# Patient Record
Sex: FEMALE | Race: WHITE | NOT HISPANIC OR LATINO | URBAN - METROPOLITAN AREA
[De-identification: names, ages, dates, MRNs, and addresses within clinical notes are randomized per-mention and may not be internally consistent; named-entity substitution may affect disease eponyms.]

---
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
## 2023-02-09 ENCOUNTER — OFFICE VISIT (OUTPATIENT)
Dept: ORTHOPEDIC SURGERY | Age: 18
End: 2023-02-09

## 2023-02-09 DIAGNOSIS — M76.52 PATELLAR TENDONITIS OF BOTH KNEES: ICD-10-CM

## 2023-02-09 DIAGNOSIS — M67.52 PLICA SYNDROME OF LEFT KNEE: Primary | ICD-10-CM

## 2023-02-09 DIAGNOSIS — M25.562 LEFT KNEE PAIN, UNSPECIFIED CHRONICITY: ICD-10-CM

## 2023-02-09 DIAGNOSIS — M76.51 PATELLAR TENDONITIS OF BOTH KNEES: ICD-10-CM

## 2023-02-09 NOTE — PROGRESS NOTES
Name: Garth Liu  YOB: 2005  Gender: female  MRN: [id number]      CC: Follow-up (Bilateral knees)       HPI: Garth Liu is a 19 y.o. female who returns for follow up on her bilateral knees. She was last evaluated by me in July and her patellar tendonitis had improved, but she had more left knee plica symptoms. She had an injection at that time and had good relief. She was evaluated by Dr. Brigette Whalen a few months ago and she altered her diet, had some blood work completed and was referred to GYN. Today she describes knee pain with not only sports but with getting up and down from a chair. She feels as though the plica symptoms have returned in the left knee. In the right knee she feels pain deep to the patellar tendon. Physical Examination:  General: no acute distress  Lungs: breathing easily  CV: regular rhythm by pulse  Left Knee: Tenderness palpation of the parapatellar region lateral superior patellar pouch. Some tenderness palpation of the inferior pole the patella in the midportion of patellar tendon pain with resisted knee extension. Negative meniscal provocative testing negative ligamentous testing. The right knee she has tenderness palpation over the patellar tendon pain with resisted knee extension        Assessment:     ICD-10-CM    1. Plica syndrome of left knee  M67.52 MRI KNEE LEFT WO CONTRAST     Amb External Referral To Physical Therapy      2. Patellar tendonitis of both knees  M76.51 Amb External Referral To Physical Therapy    M76.52       3. Left knee pain, unspecified chronicity  M25.562 MRI KNEE LEFT WO CONTRAST     Amb External Referral To Physical Therapy          Plan:   .  Recurrent symptoms. She did get good response to the intra-articular injection on the left which makes me think this likely is plica. However she does have continued recurrent patellar tendinitis symptoms. We are considering surgical intervention at this point as she is in the off season.   I think to guide that decision we need to repeat her MRI scan to see the involvement of the patellar tendon and discussed potential surgical options which may include Tenex versus open resection of the portion of the patellar tendon and a knee arthroscopy with plica resection. MRI was ordered today we also will restart her physical therapy which was ordered today and I will see her back after the MRI to review the results make a definitive treatment plan            Yovanny Mcmahon MD, [facility]

## 2023-02-23 ENCOUNTER — OFFICE VISIT (OUTPATIENT)
Dept: ORTHOPEDIC SURGERY | Age: 18
End: 2023-02-23

## 2023-02-23 DIAGNOSIS — M76.52 PATELLAR TENDINITIS, LEFT KNEE: Primary | ICD-10-CM

## 2023-02-23 DIAGNOSIS — M67.52 PLICA SYNDROME OF LEFT KNEE: ICD-10-CM

## 2023-02-23 NOTE — PROGRESS NOTES
Name: Alvin Leyva  YOB: 2005  Gender: female  MRN: [id number]      CC: MRI Left Knee follow up     HPI: Alvin Leyva is a 19 y.o. female who returns for follow up and MRI results on  left knee. She started physical therapy on Monday, 2/20/23. With a week. They think they found some mechanical issues with her foot and ankle mechanics which may help. She continues to have pain anteriorly in her knee as well as snapping on the left knee. Physical Examination:  General: no acute distress  Lungs: breathing easily  CV: regular rhythm by pulse  Left Knee:No effusion she has focal tenderness to palpation over the distal pole The patella and the proximal patellar tendon. Worse with resisted knee extension. MD she does have palpable plica and some catching and snapping when she goes from extension to flexion. Imaging:   I reviewed her repeat MRI scan which demonstrates progression of the proximal patellar tendinitis with some partial tearing of the deep articular fibers centrally. Significant tendinosis and central disease tendon. No significant edema of the distal pole the patella. All imaging interpreted by myself Yovanny Hammer MD independent of radiology review    Assessment:     ICD-10-CM    1. Patellar tendinitis, left knee  M76.52 Ambulatory referral to Orthopedic Surgery      2. Plica syndrome of left knee  M67.52 Ambulatory referral to Orthopedic Surgery           Plan: We reviewed the images together she had further progression of her proximal patellar tendinitis with partial tearing centrally compared to her previous MRI scan last year. She has been through extensive conservative management I do think she has 2 components of pain including plica syndrome as well as patellar tendinitis which is recalcitrant at this point.   We discussed continued conservative options versus surgical options I think is very reasonable to consider an open patellar tendon resection/repair as well as a knee arthroscopy with a plica resection. We discussed the postoperative recovery associated with this and hopefully a 4-month return to soccer. We will plan to schedule this after her spring break in about 5 weeks. She will continue physical therapy in the meantime. I will see her back for preoperative appointment with me for final discussion and reevaluation. Oswaldo Bains MD, [address] [place] [address] Sports Medicine

## 2023-02-28 DIAGNOSIS — M67.52 PLICA SYNDROME OF LEFT KNEE: Primary | ICD-10-CM

## 2023-02-28 DIAGNOSIS — M76.52 PATELLAR TENDINITIS, LEFT KNEE: ICD-10-CM

## 2023-02-28 DIAGNOSIS — M25.562 LEFT KNEE PAIN, UNSPECIFIED CHRONICITY: ICD-10-CM

## 2023-02-28 DIAGNOSIS — M25.561 RIGHT KNEE PAIN, UNSPECIFIED CHRONICITY: ICD-10-CM

## 2023-03-16 NOTE — PERIOP NOTE
Patient verified name and . Order for consent not found in EHR   Type 1b surgery, PAT phone assessment complete. Orders not received. Labs per surgeon: None  Labs per anesthesia protocol: None    Patient answered medical/surgical history questions at their best of ability. All prior to admission medications documented in EPIC. Patient instructed to take the following medications the day of surgery according to anesthesia guidelines with a small sip of water: None On the day before surgery please take Acetaminophen 1000mg in the morning and then again before bed. You may substitute for Tylenol 650 mg. Hold all vitamins 7 days prior to surgery and NSAIDS 5 days prior to surgery. Prescription meds to hold:None  Patient instructed on the following:    > Arrive at [facility], time of arrival to be called the day before by 1700  > NPO after midnight, unless otherwise indicated, including gum, mints, and ice chips  > Responsible adult must drive patient to the hospital, stay during surgery, and patient will need supervision 24 hours after anesthesia  > Use antibacterial soap in shower the night before surgery and on the morning of surgery  > All piercings must be removed prior to arrival.    > Leave all valuables (money and jewelry) at home but bring insurance card and ID on DOS.   > You may be required to pay a deductible or co-pay on the day of your procedure. You can pre-pay by calling [phone] if your surgery is at the [facility] or [phone] if your surgery is at the [facility] ED. > Do not wear make-up, nail polish, lotions, cologne, perfumes, powders, or oil on skin. Artificial nails are not permitted.

## 2023-03-16 NOTE — PERIOP NOTE
Deamadison King,      Thank you for completing your phone assessment with me today. Here are your requested surgery instructions. Please call #[phone] with any questions/concerns. [facility] [place], [address]/  Please arrive at [facility]; pre-op (#[phone]) will call you on the business day before your surgery with your arrival time. If you have any questions on the day of surgery, please call the pre-op dept. at the telephone number above. No food or drink after midnight which includes any gum, mints, candy, or ice chips. Please take these medications on the morning of surgery with a small sip of water: None. On the day before surgery take Acetaminophen 1000mg in the morning and at bedtime OR Acetaminophen 650mg in the morning, afternoon and bedtime. Please stop all vitamins/supplements 7 days prior to surgery and stop all NSAIDS (ibuprofen, naproxen, aleve, motrin, advil) 5 days before your surgery. A responsible adult must drive you to the hospital, remain in the building during surgery and you will need adult supervision for 24 hours after anesthesia. Please use an antibacterial soap (Dial, Safeguard, etc.) the night before surgery and on the morning of surgery. Do NOT wear deodorant, make-up, nail polish, lotions, cologne, perfumes, powders or oil on your skin. All piercings/metal/jewelry must be removed prior to arrival.  If you wear contacts then you will need to bring a case to store them in or wear your glasses. Please leave all your valuables at home but be sure to bring your insurance card and ID on the day of surgery for registration/identification. Our Guide to Surgery with additional information can be found:  [email or web address]     Per patient request, the above information was emailed to

## 2023-03-27 ENCOUNTER — OFFICE VISIT (OUTPATIENT)
Dept: ORTHOPEDIC SURGERY | Age: 18
End: 2023-03-27

## 2023-03-27 DIAGNOSIS — M67.52 PLICA SYNDROME OF LEFT KNEE: ICD-10-CM

## 2023-03-27 DIAGNOSIS — M76.52 PATELLAR TENDINITIS, LEFT KNEE: Primary | ICD-10-CM

## 2023-03-27 RX ORDER — OXYCODONE HYDROCHLORIDE 5 MG/1
5 TABLET ORAL EVERY 4 HOURS PRN
Qty: 20 TABLET | Refills: 0 | Status: SHIPPED | OUTPATIENT
Start: 2023-03-27 | End: 2023-04-03

## 2023-03-27 NOTE — PROGRESS NOTES
The brace was properly aligned medially and laterally along the length of the left Knee with the extension/flexion dials placed slightly above the patella (to insure that if brace slides down slightly the dials will still line up on either side of the patella/knee region). The brace is extended/shorten to the patient's leg length and to insure proper fit of the brace. The straps are tightened starting with the most distal strap and then strap proximal to the patella. The strap right below the patella is then secured and last is the strap highest on the quad. The straps are then trimmed for easier tightening of the brace for the patient. Patient read and signed documenting they understand and agree to [facility]'s current DME return policy.
Activity: Not on file   Stress: Not on file   Social Connections: Not on file   Intimate Partner Violence: Not on file   Housing Stability: Not on file            Physical Examination:  General: no acute distress  HEENT NCAT  Lungs: breathing easily  CV: regular rhythm by pulse  Abd: soft  Left Knee: Tenderness to palpation of the proximal patellar tendon. Pain with resisted knee extension. Assessment:     ICD-10-CM    1. Patellar tendinitis, left knee  M76.52 DME Authorization for POA - INT     oxyCODONE (ROXICODONE) 5 MG immediate release tablet      2. Plica syndrome of left knee  M67.52 DME Authorization for POA - INT     oxyCODONE (ROXICODONE) 5 MG immediate release tablet          Plan:   Recalcitrant patellar tendinitis as well as plica syndrome. We discussed that the surgical plan would be for left knee arthroscopy with likely plica resection and potentially arthroscopic debridement of the fat pad and patellar tendon. We discussed an open approach to the patellar tendinitis with excision and potential repair of a portion of the patellar tendon. We discussed the return to sport and likely a 3 to 6-month recovery. We discussed the pros and cons of the open approach versus Tenex. As well as the risk inherent to the surgery in general.  Including potential kneeling pain postoperative numbness stiffness, DVT/PE, anesthetic complications as well as risk of recurrent symptoms and potential kneeling pain. All of her questions have been answered she elects to proceed as planned. Jovita Meneses MD, [facility] and Sports Medicine

## 2023-03-27 NOTE — H&P (VIEW-ONLY)
Name: Yaron Boateng  YOB: 2005  Gender: female  MRN: [id number]      CC: Follow-up (Left knee)       HPI: Yaron Boateng is a 19 y.o. female who returns for follow up and preoperative history and physical. She is scheduled for left knee surgery on 03/31/2023. She has chronic anterior left knee pain that has failed conservative treatment. Current Outpatient Medications:     oxyCODONE (ROXICODONE) 5 MG immediate release tablet, Take 1 tablet by mouth every 4 hours as needed for Pain for up to 7 days. Max Daily Amount: 30 mg, Disp: 20 tablet, Rfl: 0    indomethacin (INDOCIN) 25 MG capsule, Take 1 capsule by mouth 3 times daily for 7 days, Disp: 21 capsule, Rfl: 0    diclofenac (VOLTAREN) 75 MG EC tablet, Take 1 tablet by mouth in the morning and 1 tablet before bedtime. Do all this for 14 days. , Disp: 28 tablet, Rfl: 0    cephALEXin (KEFLEX) 250 MG capsule, Take 250 mg by mouth 2 times daily (Patient not taking: Reported on 3/16/2023), Disp: , Rfl:     spironolactone (ALDACTONE) 100 MG tablet, Take 100 mg by mouth daily (Patient not taking: Reported on 3/16/2023), Disp: , Rfl:   No Known Allergies  History reviewed. No pertinent past medical history. History reviewed. No pertinent surgical history. History reviewed. No pertinent family history.   Social History     Socioeconomic History    Marital status: Single     Spouse name: Not on file    Number of children: Not on file    Years of education: Not on file    Highest education level: Not on file   Occupational History    Not on file   Tobacco Use    Smoking status: Never    Smokeless tobacco: Never   Substance and Sexual Activity    Alcohol use: Never    Drug use: Not on file    Sexual activity: Not on file   Other Topics Concern    Not on file   Social History Narrative    Not on file     Social Determinants of Health     Financial Resource Strain: Not on file   Food Insecurity: Not on file   Transportation Needs: Not on file   Physical Activity: Not on file   Stress: Not on file   Social Connections: Not on file   Intimate Partner Violence: Not on file   Housing Stability: Not on file            Physical Examination:  General: no acute distress  HEENT NCAT  Lungs: breathing easily  CV: regular rhythm by pulse  Abd: soft  Left Knee: Tenderness to palpation of the proximal patellar tendon. Pain with resisted knee extension. Assessment:     ICD-10-CM    1. Patellar tendinitis, left knee  M76.52 DME Authorization for POA - INT     oxyCODONE (ROXICODONE) 5 MG immediate release tablet      2. Plica syndrome of left knee  M67.52 DME Authorization for POA - INT     oxyCODONE (ROXICODONE) 5 MG immediate release tablet          Plan:   Recalcitrant patellar tendinitis as well as plica syndrome. We discussed that the surgical plan would be for left knee arthroscopy with likely plica resection and potentially arthroscopic debridement of the fat pad and patellar tendon. We discussed an open approach to the patellar tendinitis with excision and potential repair of a portion of the patellar tendon. We discussed the return to sport and likely a 3 to 6-month recovery. We discussed the pros and cons of the open approach versus Tenex. As well as the risk inherent to the surgery in general.  Including potential kneeling pain postoperative numbness stiffness, DVT/PE, anesthetic complications as well as risk of recurrent symptoms and potential kneeling pain. All of her questions have been answered she elects to proceed as planned. Marissa Slater MD, [address] [facility] and Sports Medicine

## 2023-03-30 ENCOUNTER — ANESTHESIA EVENT (OUTPATIENT)
Dept: SURGERY | Age: 18
End: 2023-03-30
Payer: COMMERCIAL

## 2023-03-30 NOTE — PERIOP NOTE
Preop department called to notify patient of arrival time for scheduled procedure. Instructions given to   - Arrive at [address]. - Remain NPO after midnight, unless otherwise indicated, including gum, mints, and ice chips. - Have a responsible adult to drive patient to the hospital, stay during surgery, and patient will need supervision 24 hours after anesthesia. - Use antibacterial soap in shower the night before surgery and on the morning of surgery.        Was patient contacted: yes  Voicemail left: n/a  Numbers contacted: [phone]   Arrival time: 0934

## 2023-03-31 ENCOUNTER — HOSPITAL ENCOUNTER (OUTPATIENT)
Age: 18
Setting detail: OUTPATIENT SURGERY
Discharge: HOME | End: 2023-03-31
Attending: ORTHOPAEDIC SURGERY | Admitting: ORTHOPAEDIC SURGERY
Payer: COMMERCIAL

## 2023-03-31 ENCOUNTER — ANESTHESIA (OUTPATIENT)
Dept: SURGERY | Age: 18
End: 2023-03-31
Payer: COMMERCIAL

## 2023-03-31 VITALS
BODY MASS INDEX: 27.6 KG/M2 | RESPIRATION RATE: 16 BRPM | DIASTOLIC BLOOD PRESSURE: 56 MMHG | HEIGHT: 62 IN | HEART RATE: 76 BPM | TEMPERATURE: 97.9 F | SYSTOLIC BLOOD PRESSURE: 116 MMHG | WEIGHT: 150 LBS | OXYGEN SATURATION: 100 %

## 2023-03-31 DIAGNOSIS — M76.52 PATELLAR TENDINITIS, LEFT KNEE: ICD-10-CM

## 2023-03-31 DIAGNOSIS — M67.52 PLICA SYNDROME OF LEFT KNEE: ICD-10-CM

## 2023-03-31 LAB — HCG UR QL: NEGATIVE

## 2023-03-31 PROCEDURE — 3600000014 HC SURGERY LEVEL 4 ADDTL 15MIN: Performed by: ORTHOPAEDIC SURGERY

## 2023-03-31 PROCEDURE — 3700000000 HC ANESTHESIA ATTENDED CARE: Performed by: ORTHOPAEDIC SURGERY

## 2023-03-31 PROCEDURE — 6360000002 HC RX W HCPCS: Performed by: NURSE ANESTHETIST, CERTIFIED REGISTERED

## 2023-03-31 PROCEDURE — 27380 REPAIR OF KNEECAP TENDON: CPT | Performed by: ORTHOPAEDIC SURGERY

## 2023-03-31 PROCEDURE — 2720000010 HC SURG SUPPLY STERILE: Performed by: ORTHOPAEDIC SURGERY

## 2023-03-31 PROCEDURE — 2709999900 HC NON-CHARGEABLE SUPPLY: Performed by: ORTHOPAEDIC SURGERY

## 2023-03-31 PROCEDURE — 2580000003 HC RX 258: Performed by: NURSE ANESTHETIST, CERTIFIED REGISTERED

## 2023-03-31 PROCEDURE — 3600000004 HC SURGERY LEVEL 4 BASE: Performed by: ORTHOPAEDIC SURGERY

## 2023-03-31 PROCEDURE — 6370000000 HC RX 637 (ALT 250 FOR IP): Performed by: ANESTHESIOLOGY

## 2023-03-31 PROCEDURE — 81025 URINE PREGNANCY TEST: CPT

## 2023-03-31 PROCEDURE — 2500000003 HC RX 250 WO HCPCS: Performed by: NURSE ANESTHETIST, CERTIFIED REGISTERED

## 2023-03-31 PROCEDURE — 6360000002 HC RX W HCPCS: Performed by: ANESTHESIOLOGY

## 2023-03-31 PROCEDURE — 29875 ARTHRS KNEE SURG SYNVCT LMTD: CPT | Performed by: ORTHOPAEDIC SURGERY

## 2023-03-31 PROCEDURE — 6360000002 HC RX W HCPCS: Performed by: SPECIALIST/TECHNOLOGIST

## 2023-03-31 PROCEDURE — 6360000002 HC RX W HCPCS: Performed by: ORTHOPAEDIC SURGERY

## 2023-03-31 PROCEDURE — 7100000001 HC PACU RECOVERY - ADDTL 15 MIN: Performed by: ORTHOPAEDIC SURGERY

## 2023-03-31 PROCEDURE — 7100000000 HC PACU RECOVERY - FIRST 15 MIN: Performed by: ORTHOPAEDIC SURGERY

## 2023-03-31 PROCEDURE — 3700000001 HC ADD 15 MINUTES (ANESTHESIA): Performed by: ORTHOPAEDIC SURGERY

## 2023-03-31 PROCEDURE — 7100000010 HC PHASE II RECOVERY - FIRST 15 MIN: Performed by: ORTHOPAEDIC SURGERY

## 2023-03-31 PROCEDURE — C1769 GUIDE WIRE: HCPCS | Performed by: ORTHOPAEDIC SURGERY

## 2023-03-31 PROCEDURE — 2580000003 HC RX 258: Performed by: ANESTHESIOLOGY

## 2023-03-31 RX ORDER — SODIUM CHLORIDE, SODIUM LACTATE, POTASSIUM CHLORIDE, CALCIUM CHLORIDE 600; 310; 30; 20 MG/100ML; MG/100ML; MG/100ML; MG/100ML
INJECTION, SOLUTION INTRAVENOUS CONTINUOUS
Status: DISCONTINUED | OUTPATIENT
Start: 2023-03-31 | End: 2023-03-31 | Stop reason: HOSPADM

## 2023-03-31 RX ORDER — PROCHLORPERAZINE EDISYLATE 5 MG/ML
5 INJECTION INTRAMUSCULAR; INTRAVENOUS
Status: DISCONTINUED | OUTPATIENT
Start: 2023-03-31 | End: 2023-03-31 | Stop reason: HOSPADM

## 2023-03-31 RX ORDER — SODIUM CHLORIDE 0.9 % (FLUSH) 0.9 %
5-40 SYRINGE (ML) INJECTION PRN
Status: DISCONTINUED | OUTPATIENT
Start: 2023-03-31 | End: 2023-03-31 | Stop reason: HOSPADM

## 2023-03-31 RX ORDER — ONDANSETRON 2 MG/ML
INJECTION INTRAMUSCULAR; INTRAVENOUS PRN
Status: DISCONTINUED | OUTPATIENT
Start: 2023-03-31 | End: 2023-03-31 | Stop reason: SDUPTHER

## 2023-03-31 RX ORDER — APREPITANT 40 MG/1
40 CAPSULE ORAL ONCE
Status: COMPLETED | OUTPATIENT
Start: 2023-03-31 | End: 2023-03-31

## 2023-03-31 RX ORDER — LIDOCAINE HYDROCHLORIDE 10 MG/ML
1 INJECTION, SOLUTION INFILTRATION; PERINEURAL
Status: DISCONTINUED | OUTPATIENT
Start: 2023-03-31 | End: 2023-03-31 | Stop reason: HOSPADM

## 2023-03-31 RX ORDER — HYDROMORPHONE HYDROCHLORIDE 2 MG/ML
0.5 INJECTION, SOLUTION INTRAMUSCULAR; INTRAVENOUS; SUBCUTANEOUS EVERY 10 MIN PRN
Status: DISCONTINUED | OUTPATIENT
Start: 2023-03-31 | End: 2023-03-31 | Stop reason: HOSPADM

## 2023-03-31 RX ORDER — DEXAMETHASONE SODIUM PHOSPHATE 4 MG/ML
INJECTION, SOLUTION INTRA-ARTICULAR; INTRALESIONAL; INTRAMUSCULAR; INTRAVENOUS; SOFT TISSUE PRN
Status: DISCONTINUED | OUTPATIENT
Start: 2023-03-31 | End: 2023-03-31 | Stop reason: SDUPTHER

## 2023-03-31 RX ORDER — EPHEDRINE SULFATE/0.9% NACL/PF 50 MG/5 ML
SYRINGE (ML) INTRAVENOUS PRN
Status: DISCONTINUED | OUTPATIENT
Start: 2023-03-31 | End: 2023-03-31 | Stop reason: SDUPTHER

## 2023-03-31 RX ORDER — SODIUM CHLORIDE 9 MG/ML
INJECTION, SOLUTION INTRAVENOUS PRN
Status: DISCONTINUED | OUTPATIENT
Start: 2023-03-31 | End: 2023-03-31 | Stop reason: HOSPADM

## 2023-03-31 RX ORDER — LIDOCAINE HYDROCHLORIDE 20 MG/ML
INJECTION, SOLUTION EPIDURAL; INFILTRATION; INTRACAUDAL; PERINEURAL PRN
Status: DISCONTINUED | OUTPATIENT
Start: 2023-03-31 | End: 2023-03-31 | Stop reason: SDUPTHER

## 2023-03-31 RX ORDER — MIDAZOLAM HYDROCHLORIDE 2 MG/2ML
2 INJECTION, SOLUTION INTRAMUSCULAR; INTRAVENOUS
Status: DISCONTINUED | OUTPATIENT
Start: 2023-03-31 | End: 2023-03-31 | Stop reason: HOSPADM

## 2023-03-31 RX ORDER — SODIUM CHLORIDE 0.9 % (FLUSH) 0.9 %
5-40 SYRINGE (ML) INJECTION EVERY 12 HOURS SCHEDULED
Status: DISCONTINUED | OUTPATIENT
Start: 2023-03-31 | End: 2023-03-31 | Stop reason: HOSPADM

## 2023-03-31 RX ORDER — DEXTROSE MONOHYDRATE 100 MG/ML
INJECTION, SOLUTION INTRAVENOUS CONTINUOUS PRN
Status: DISCONTINUED | OUTPATIENT
Start: 2023-03-31 | End: 2023-03-31 | Stop reason: HOSPADM

## 2023-03-31 RX ORDER — ACETAMINOPHEN 500 MG
1000 TABLET ORAL ONCE
Status: COMPLETED | OUTPATIENT
Start: 2023-03-31 | End: 2023-03-31

## 2023-03-31 RX ORDER — FENTANYL CITRATE 50 UG/ML
INJECTION, SOLUTION INTRAMUSCULAR; INTRAVENOUS PRN
Status: DISCONTINUED | OUTPATIENT
Start: 2023-03-31 | End: 2023-03-31 | Stop reason: SDUPTHER

## 2023-03-31 RX ORDER — ROPIVACAINE HYDROCHLORIDE 5 MG/ML
INJECTION, SOLUTION EPIDURAL; INFILTRATION; PERINEURAL PRN
Status: DISCONTINUED | OUTPATIENT
Start: 2023-03-31 | End: 2023-03-31 | Stop reason: HOSPADM

## 2023-03-31 RX ORDER — PROPOFOL 10 MG/ML
INJECTION, EMULSION INTRAVENOUS PRN
Status: DISCONTINUED | OUTPATIENT
Start: 2023-03-31 | End: 2023-03-31 | Stop reason: SDUPTHER

## 2023-03-31 RX ORDER — FENTANYL CITRATE 50 UG/ML
100 INJECTION, SOLUTION INTRAMUSCULAR; INTRAVENOUS
Status: DISCONTINUED | OUTPATIENT
Start: 2023-03-31 | End: 2023-03-31 | Stop reason: HOSPADM

## 2023-03-31 RX ORDER — DIPHENHYDRAMINE HYDROCHLORIDE 50 MG/ML
12.5 INJECTION INTRAMUSCULAR; INTRAVENOUS
Status: DISCONTINUED | OUTPATIENT
Start: 2023-03-31 | End: 2023-03-31 | Stop reason: HOSPADM

## 2023-03-31 RX ORDER — OXYCODONE HYDROCHLORIDE 5 MG/1
5 TABLET ORAL
Status: COMPLETED | OUTPATIENT
Start: 2023-03-31 | End: 2023-03-31

## 2023-03-31 RX ADMIN — Medication 10 MG: at 08:04

## 2023-03-31 RX ADMIN — PROPOFOL 200 MG: 10 INJECTION, EMULSION INTRAVENOUS at 07:15

## 2023-03-31 RX ADMIN — PHENYLEPHRINE HYDROCHLORIDE 100 MCG: 10 INJECTION INTRAVENOUS at 07:32

## 2023-03-31 RX ADMIN — PHENYLEPHRINE HYDROCHLORIDE 100 MCG: 10 INJECTION INTRAVENOUS at 07:46

## 2023-03-31 RX ADMIN — PROPOFOL 200 MG: 10 INJECTION, EMULSION INTRAVENOUS at 07:14

## 2023-03-31 RX ADMIN — FENTANYL CITRATE 50 MCG: 50 INJECTION, SOLUTION INTRAMUSCULAR; INTRAVENOUS at 07:25

## 2023-03-31 RX ADMIN — PHENYLEPHRINE HYDROCHLORIDE 100 MCG: 10 INJECTION INTRAVENOUS at 07:52

## 2023-03-31 RX ADMIN — Medication 2000 MG: at 07:29

## 2023-03-31 RX ADMIN — ACETAMINOPHEN 1000 MG: 500 TABLET, FILM COATED ORAL at 06:15

## 2023-03-31 RX ADMIN — APREPITANT 40 MG: 40 CAPSULE ORAL at 06:15

## 2023-03-31 RX ADMIN — PHENYLEPHRINE HYDROCHLORIDE 100 MCG: 10 INJECTION INTRAVENOUS at 08:02

## 2023-03-31 RX ADMIN — FENTANYL CITRATE 25 MCG: 50 INJECTION, SOLUTION INTRAMUSCULAR; INTRAVENOUS at 07:38

## 2023-03-31 RX ADMIN — ONDANSETRON 4 MG: 2 INJECTION INTRAMUSCULAR; INTRAVENOUS at 08:14

## 2023-03-31 RX ADMIN — DEXAMETHASONE SODIUM PHOSPHATE 4 MG: 4 INJECTION, SOLUTION INTRAMUSCULAR; INTRAVENOUS at 07:48

## 2023-03-31 RX ADMIN — LIDOCAINE HYDROCHLORIDE 100 MG: 20 INJECTION, SOLUTION EPIDURAL; INFILTRATION; INTRACAUDAL; PERINEURAL at 07:14

## 2023-03-31 RX ADMIN — FENTANYL CITRATE 25 MCG: 50 INJECTION, SOLUTION INTRAMUSCULAR; INTRAVENOUS at 07:56

## 2023-03-31 RX ADMIN — HYDROMORPHONE HYDROCHLORIDE 0.5 MG: 2 INJECTION, SOLUTION INTRAMUSCULAR; INTRAVENOUS; SUBCUTANEOUS at 08:58

## 2023-03-31 RX ADMIN — SODIUM CHLORIDE, POTASSIUM CHLORIDE, SODIUM LACTATE AND CALCIUM CHLORIDE: 600; 310; 30; 20 INJECTION, SOLUTION INTRAVENOUS at 06:15

## 2023-03-31 RX ADMIN — OXYCODONE HYDROCHLORIDE 5 MG: 5 TABLET ORAL at 09:14

## 2023-03-31 RX ADMIN — PHENYLEPHRINE HYDROCHLORIDE 100 MCG: 10 INJECTION INTRAVENOUS at 07:38

## 2023-03-31 ASSESSMENT — PAIN DESCRIPTION - ORIENTATION
ORIENTATION: LEFT
ORIENTATION: LEFT

## 2023-03-31 ASSESSMENT — PAIN SCALES - GENERAL
PAINLEVEL_OUTOF10: 8
PAINLEVEL_OUTOF10: 3

## 2023-03-31 ASSESSMENT — PAIN DESCRIPTION - LOCATION
LOCATION: KNEE
LOCATION: KNEE

## 2023-03-31 ASSESSMENT — PAIN - FUNCTIONAL ASSESSMENT: PAIN_FUNCTIONAL_ASSESSMENT: 0-10

## 2023-03-31 NOTE — ANESTHESIA PROCEDURE NOTES
Airway  Date/Time: 3/31/2023 7:16 AM  Urgency: elective    Airway not difficult    General Information and Staff    Patient location during procedure: OR  Resident/CRNA: SHARA Ngo CRNA  Performed: resident/CRNA     Indications and Patient Condition  Indications for airway management: anesthesia  Spontaneous Ventilation: absent  Sedation level: deep  Preoxygenated: yes  Patient position: sniffing  MILS not maintained throughout  Mask difficulty assessment: vent by bag mask    Final Airway Details  Final airway type: supraglottic airway      Successful airway: oropharyngeal  Size 4     Number of attempts at approach: 1  Ventilation between attempts: supraglottic airway  Number of other approaches attempted: 0    Additional Comments  Performed by Hipolito Allen SRNA

## 2023-03-31 NOTE — INTERVAL H&P NOTE
Update History & Physical    The Patient's History and Physical was reviewed   I discussed the surgery and patients medical condition with the patient. The chart was reviewed with the patient and I examined the patient. There was no change from previous note. The surgical site was confirmed by the patient and me. CV: RRR  RESP: CTAB    Plan:  The risk, benefits, expected outcome, and alternative to the recommended procedure have been discussed with the patient. Patient understands and elects to proceed with the procedure as planned.     Electronically signed by Lilo Raymond MD on 03/31/23 6:55 AM

## 2023-03-31 NOTE — ANESTHESIA POSTPROCEDURE EVALUATION
Department of Anesthesiology  Postprocedure Note    Patient: Martinez Augustin  MRN: [id number]  YOB: 2005  Date of evaluation: 3/31/2023      Procedure Summary     Date: 03/31/23 Room / Location: D OP OR 07 / [facility]    Anesthesia Start: 4617 Anesthesia Stop: Doreatha Dax    Procedures:       LEFT PATELLA TENDON REPAIR (Left: Knee)      LEFT KNEE ARTHROSCOPY  PLICA  EXCISION (Left: Knee) Diagnosis:       Patellar tendinitis, left knee      Plica syndrome of left knee      (Patellar tendinitis, left knee [A48.92])      (Plica syndrome of left knee [M67.52])    Surgeons: Lawanda Bence, MD Responsible Provider: Hipolito Knutson MD    Anesthesia Type: General ASA Status: 1          Anesthesia Type: General    Stuart Phase I: Stuart Score: 10    Stuart Phase II: Stuart Score: 10      Anesthesia Post Evaluation    Patient location during evaluation: PACU  Patient participation: complete - patient participated  Level of consciousness: awake and alert  Airway patency: patent  Nausea: well controlled. Complications: no  Cardiovascular status: acceptable.   Respiratory status: acceptable  Hydration status: stable

## 2023-03-31 NOTE — ANESTHESIA PRE PROCEDURE
Department of Anesthesiology  Preprocedure Note       Name:  José Luis Steele   Age:  19 y.o.  :  2005                                          MRN:  [id number]         Date:  3/31/2023      Surgeon: Umm Query):  Yair Mcleod MD    Procedure: Procedure(s):  LEFT PATELLA TENDON REPAIR    SUPINE  LEFT KNEE ARTHROSCOPY  PLICA EXCISION       SUPINE    Medications prior to admission:   Prior to Admission medications    Medication Sig Start Date End Date Taking? Authorizing Provider   oxyCODONE (ROXICODONE) 5 MG immediate release tablet Take 1 tablet by mouth every 4 hours as needed for Pain for up to 7 days. Max Daily Amount: 30 mg 3/27/23 4/3/23  Yair Mcleod MD   indomethacin (INDOCIN) 25 MG capsule Take 1 capsule by mouth 3 times daily for 7 days 9/29/22 10/6/22  Aliyah Olson MD   diclofenac (VOLTAREN) 75 MG EC tablet Take 1 tablet by mouth in the morning and 1 tablet before bedtime. Do all this for 14 days.  22  SHARA Anand CNP   cephALEXin (KEFLEX) 250 MG capsule Take 250 mg by mouth 2 times daily  Patient not taking: Reported on 3/16/2023 11/15/21   Ar Automatic Reconciliation   spironolactone (ALDACTONE) 100 MG tablet Take 100 mg by mouth daily  Patient not taking: Reported on 3/16/2023 11/12/21   Ar Automatic Reconciliation       Current medications:    Current Facility-Administered Medications   Medication Dose Route Frequency Provider Last Rate Last Admin    sodium chloride flush 0.9 % injection 5-40 mL  5-40 mL IntraVENous 2 times per day SHARA Anand CNP        sodium chloride flush 0.9 % injection 5-40 mL  5-40 mL IntraVENous PRN SHARA Anand CNP        0.9 % sodium chloride infusion   IntraVENous PRN SHARA Anand CNP        ceFAZolin (ANCEF) 2000 mg in sterile water 20 mL IV syringe  2,000 mg IntraVENous On Call to [facility], APRN - CNP        lidocaine 1 % injection 1 mL  1 mL IntraDERmal Once PRN Amaris Gómez MD        fentaNYL

## 2023-03-31 NOTE — OP NOTE
Operative Report    Patient: Claudeen Amos MRN: [id number]  SSN: [id number]    YOB: 2005  Age: 19 y.o. Sex: female       Date of Surgery: 3/31/2023     Preoperative Diagnosis:   1) left knee recalcitrant patellar tendinitis   2)  left  Knee plica syndrome    Postoperative Diagnosis: Same    Surgeon(s) and Role:     * Selena Buck MD - Primary    Anesthesia: General     Procedure:    1) left knee patellar tendon repair with partial excision  2)left  Knee arthroscopy with partial synovectomy      Estimated Blood Loss:  10 mL    Tourniquet Time:   Total Tourniquet Time Documented:  Thigh (Left) - 50 minutes  Total: Thigh (Left) - 50 minutes          Implants:   None           Specimens: * No specimens in log *        Drains: None                Complications: None    Counts: Sponge and needle counts were correct times two. Indications: See H&P      Findings:  Hypertrophic synovitis and parapatellar plica formation in the anteromedial anterolateral aspect of the knee with tight lateral retinaculum  Extensive patellar tendinitis/tendinosis of the central portion    Procedure in Detail: After informed consent was obtained the surgical site was marked in the preoperative area by myself the surgeon. Patient was brought to the operating room placed supine the operating table general anesthesia was induced. The operative extremity was prepped and draped in usual orthopedic sterile fashion timeout was performed per protocol. Antibiotics were given per protocol. Initially a standard inferolateral arthroscopy portal was established. Diagnostic arthroscopy was carried out. Suprapatellar pouch was benign. Patellofemoral compartment demonstrated maintenance of the articular surfaces of the undersurface of the patella and  of the trochlea but significant hypertrophic synovitis that encroached upon the patellofemoral compartment from the anterior medial aspect. . Medial and lateral gutters were free of loose bodies. Anteromedial portal was established under spinal needle guidance. There was a large amount of synovitis in the anterior interval both anteromedially and anterior laterally. Synovitis in anterior interval was debrided with a motorized shaver. ACL was intact and stable to probing with good tension. PCL was intact. Lateral compartment demonstrated maintenance the articular joint surface the meniscus was intact and stable to probing      The medial compartment demonstrated maintenance of the articular joint surface the meniscus was intact and stable to probing  We then switched portals viewed from medially work from laterally there was hypertrophic anterolateral synovial tissue with a thick plica type band that was transected using an RF device. There is also a tight lateral retinaculum with some increased patellar tilt and lateral patellar tracking. We performed a very limited lateral release taking just a few fibers of the articular side of the retinaculum relaxing this with RF device and allowing the patella to neutralize a little bit. We then used combination of an RF device and a shaver to take away hypertrophic synovitis and fat that portion of the inferior pole of the patella. We then switched the camera back to laterally with from anterior medially and exposed the distal pole the patella and used an RF device and a shaver to debride that and debride portions of the infrapatellar fat pad dissecting towards the patellar tendon. Hypertrophic synovitis and plica formation anterior medially was removed with an RF device and a shaver as well. Portals were then closed with buried Monocryl suture. We then made a small midline longitudinal incision over the inferior pole the patella and proximal patellar tendon. Full-thickness flaps were carried down to the peritenon which was then incised with a 15 blade.   This was then elevated up off of the patellar tendon and protected for later

## 2023-04-03 ENCOUNTER — TELEPHONE (OUTPATIENT)
Dept: ORTHOPEDIC SURGERY | Age: 18
End: 2023-04-03

## 2023-04-03 DIAGNOSIS — Z09 S/P ORTHOPEDIC SURGERY, FOLLOW-UP EXAM: Primary | ICD-10-CM

## 2023-04-03 NOTE — TELEPHONE ENCOUNTER
Patient would like to get a handicap placard form please call her to let her know when and where she can  the form

## 2023-04-04 ENCOUNTER — TELEPHONE (OUTPATIENT)
Dept: ORTHOPEDIC SURGERY | Age: 18
End: 2023-04-04

## 2023-04-04 NOTE — TELEPHONE ENCOUNTER
Handicap form and prescription completed and will be left at the [facility] office. I tried to reach [facility] and she did not answer and her voicemail is not set up.

## 2023-04-04 NOTE — TELEPHONE ENCOUNTER
I spoke with Jeremy Godfrey and we will leave the handicap placard application at the [facility] office for her to .

## 2023-04-17 ENCOUNTER — OFFICE VISIT (OUTPATIENT)
Dept: ORTHOPEDIC SURGERY | Age: 18
End: 2023-04-17

## 2023-04-17 DIAGNOSIS — M76.52 PATELLAR TENDINITIS, LEFT KNEE: ICD-10-CM

## 2023-04-17 DIAGNOSIS — M67.52 PLICA SYNDROME OF LEFT KNEE: ICD-10-CM

## 2023-04-17 DIAGNOSIS — Z09 S/P ORTHOPEDIC SURGERY, FOLLOW-UP EXAM: Primary | ICD-10-CM

## 2023-04-17 NOTE — PROGRESS NOTES
Name: Humble London  YOB: 2005  Gender: female  MRN: [id number]    Procedure Performed:   1) left knee patellar tendon repair with partial excision  2)left  Knee arthroscopy with partial synovectomy      Date of Procedure: 3/31/2023      Subjective: She is 17 days s/p of the above procedure. She is progressing well with no concerns or complaints at this time. She is attending formal physical therapy which is progressing well and she has been compliant with use of the brace. Physical Examination:Incisions clean dry and intact, no sign of infection. Motor and sensory intact. Minimal effusion present. Mild ecchymosis in the medial joint line. Full active extension with flexion limited to 90 degrees. Right knee has tenderness over the proximal third of the patellar tendon mid substance. Pain with resisted knee extension. Assessment:   1. S/P orthopedic surgery, follow-up exam    2. Patellar tendinitis, left knee    3. Plica syndrome of left knee         Plan:   Doing well. We discussed appropriate progression of activity off of the crutches with weightbearing in extension and then unlocking of the brace as her quad function and swelling allow. Continue PT per 0-90 degrees range of motion x 2 weeks then as tolerated . gradual progression of weightbearing as tolerated in extension locked in the brace. We also discussed potential surgical treatment of the right knee patellar tendinitis which is been recurrent. She is very interested in this and trying to be ready for soccer season. We discussed potentially the same procedure versus minimally invasive options such as Tenex or arthroscopic. She is very interested in that approach on the right side which is less symptomatic. We will plan to proceed with that we will see her back in a few weeks for preoperative appointment and further discussion of that.   Follow up in 4 weeks

## 2023-04-18 DIAGNOSIS — M25.561 RIGHT KNEE PAIN, UNSPECIFIED CHRONICITY: ICD-10-CM

## 2023-04-18 DIAGNOSIS — M76.51 PATELLAR TENDINITIS, RIGHT KNEE: Primary | ICD-10-CM

## 2023-04-25 ENCOUNTER — TELEPHONE (OUTPATIENT)
Dept: ORTHOPEDIC SURGERY | Age: 18
End: 2023-04-25

## 2023-05-15 ENCOUNTER — OFFICE VISIT (OUTPATIENT)
Dept: ORTHOPEDIC SURGERY | Age: 18
End: 2023-05-15

## 2023-05-15 DIAGNOSIS — M76.52 PATELLAR TENDINITIS, LEFT KNEE: ICD-10-CM

## 2023-05-15 DIAGNOSIS — M76.51 PATELLAR TENDINITIS, RIGHT KNEE: Primary | ICD-10-CM

## 2023-05-15 PROCEDURE — 99024 POSTOP FOLLOW-UP VISIT: CPT | Performed by: ORTHOPAEDIC SURGERY

## 2023-05-15 NOTE — PROGRESS NOTES
Name: Michael Ibrahim  YOB: 2005  Gender: female  MRN: [id number]      CC: Knee Pain (B)       HPI: Michael Ibrahim is a 19 y.o. female who returns for follow up on B knees. She had a L patellar tendon repair done  on 3/31/2023. She reports her L knee is doing well and did not note any discomfort. She would like to explore the noninvasive options to address her R patellar tendon. She is concerned about repeating her L knee procedure on her R knee due to how close her athletic season is. She wants to consider potential tenex        Physical Examination:  General: no acute distress  Lungs: breathing easily  CV: regular rhythm by pulse  Left Knee: Incision is well-healed full symmetric range of motion of the contralateral side nontender over the patellar tendon good quad activation some increasing tone. She still has persistent tenderness palpation over the patellar tendon mid substance and proximal portion on the right knee. Pain with resisted knee extension pain at the extremes of motion. Assessment:     ICD-10-CM    1. Patellar tendinitis, right knee  M76.51       2. Patellar tendinitis, left knee  M76.52           Plan:   Left knee is progressing well continue to progress her activity as tolerated. Right knee we placed a diagnostic ultrasound on this today we can see intrasubstance tendinosis and hypoechogenicity of the proximal portion adjacent to the distal pole the patella. I do think is reasonable to proceed with a Tenex procedure if she desires. She understands this may be somewhat unpredictable compared to an open approach but may offer some advantages of accelerated recovery time. We reviewed details risk and benefits of that procedure and the postoperative course associated with it. That is what she is elected to proceed but she understands we may have to open this if we cannot get good visualization.     The surgical plan to be for right knee arthroscopy and patellar tendon

## 2023-05-25 NOTE — PERIOP NOTE
Preop department called to notify patient of arrival time for scheduled procedure. Instructions given to   - Arrive at [address] - Remain NPO after midnight, unless otherwise indicated, including gum, mints, and ice chips. - Have a responsible adult to drive patient to the hospital, stay during surgery, and patient will need supervision 24 hours after anesthesia. - Use antibacterial soap in shower the night before surgery and on the morning of surgery.        Was patient contacted: yes-mother arielle  Voicemail left:   Numbers contacted: [phone]   Arrival time: 0700

## 2023-05-25 NOTE — PERIOP NOTE
Preop department called to notify patient of arrival time for scheduled procedure. Instructions given to   - Arrive at [address]. - Remain NPO after midnight, unless otherwise indicated, including gum, mints, and ice chips. - Have a responsible adult to drive patient to the hospital, stay during surgery, and patient will need supervision 24 hours after anesthesia. - Use antibacterial soap in shower the night before surgery and on the morning of surgery.        Was patient contacted: mom  Voicemail left:   Numbers contacted: [phone]   Arrival time: 0900

## 2023-05-26 ENCOUNTER — ANESTHESIA (OUTPATIENT)
Dept: SURGERY | Age: 18
End: 2023-05-26
Payer: COMMERCIAL

## 2023-05-26 ENCOUNTER — HOSPITAL ENCOUNTER (OUTPATIENT)
Age: 18
Setting detail: OUTPATIENT SURGERY
Discharge: HOME | End: 2023-05-26
Attending: ORTHOPAEDIC SURGERY | Admitting: ORTHOPAEDIC SURGERY
Payer: COMMERCIAL

## 2023-05-26 ENCOUNTER — ANESTHESIA EVENT (OUTPATIENT)
Dept: SURGERY | Age: 18
End: 2023-05-26
Payer: COMMERCIAL

## 2023-05-26 VITALS
BODY MASS INDEX: 27.6 KG/M2 | HEIGHT: 62 IN | SYSTOLIC BLOOD PRESSURE: 111 MMHG | WEIGHT: 150 LBS | DIASTOLIC BLOOD PRESSURE: 57 MMHG | HEART RATE: 77 BPM | OXYGEN SATURATION: 95 % | TEMPERATURE: 97.8 F | RESPIRATION RATE: 20 BRPM

## 2023-05-26 DIAGNOSIS — Z01.818 PRE-OP TESTING: Primary | ICD-10-CM

## 2023-05-26 DIAGNOSIS — M76.51 PATELLAR TENDINITIS, RIGHT KNEE: ICD-10-CM

## 2023-05-26 LAB — HCG UR QL: NEGATIVE

## 2023-05-26 PROCEDURE — 6360000002 HC RX W HCPCS: Performed by: SPECIALIST/TECHNOLOGIST

## 2023-05-26 PROCEDURE — 7100000000 HC PACU RECOVERY - FIRST 15 MIN: Performed by: ORTHOPAEDIC SURGERY

## 2023-05-26 PROCEDURE — 81025 URINE PREGNANCY TEST: CPT

## 2023-05-26 PROCEDURE — 7100000001 HC PACU RECOVERY - ADDTL 15 MIN: Performed by: ORTHOPAEDIC SURGERY

## 2023-05-26 PROCEDURE — 2580000003 HC RX 258: Performed by: ANESTHESIOLOGY

## 2023-05-26 PROCEDURE — 3700000000 HC ANESTHESIA ATTENDED CARE: Performed by: ORTHOPAEDIC SURGERY

## 2023-05-26 PROCEDURE — 2500000003 HC RX 250 WO HCPCS: Performed by: NURSE ANESTHETIST, CERTIFIED REGISTERED

## 2023-05-26 PROCEDURE — 3600000004 HC SURGERY LEVEL 4 BASE: Performed by: ORTHOPAEDIC SURGERY

## 2023-05-26 PROCEDURE — 7100000010 HC PHASE II RECOVERY - FIRST 15 MIN: Performed by: ORTHOPAEDIC SURGERY

## 2023-05-26 PROCEDURE — 2720000010 HC SURG SUPPLY STERILE: Performed by: ORTHOPAEDIC SURGERY

## 2023-05-26 PROCEDURE — C1769 GUIDE WIRE: HCPCS | Performed by: ORTHOPAEDIC SURGERY

## 2023-05-26 PROCEDURE — 3600000014 HC SURGERY LEVEL 4 ADDTL 15MIN: Performed by: ORTHOPAEDIC SURGERY

## 2023-05-26 PROCEDURE — 2709999900 HC NON-CHARGEABLE SUPPLY: Performed by: ORTHOPAEDIC SURGERY

## 2023-05-26 PROCEDURE — 6360000002 HC RX W HCPCS: Performed by: ORTHOPAEDIC SURGERY

## 2023-05-26 PROCEDURE — 3700000001 HC ADD 15 MINUTES (ANESTHESIA): Performed by: ORTHOPAEDIC SURGERY

## 2023-05-26 PROCEDURE — 6360000002 HC RX W HCPCS: Performed by: NURSE ANESTHETIST, CERTIFIED REGISTERED

## 2023-05-26 RX ORDER — SODIUM CHLORIDE 0.9 % (FLUSH) 0.9 %
5-40 SYRINGE (ML) INJECTION PRN
Status: DISCONTINUED | OUTPATIENT
Start: 2023-05-26 | End: 2023-05-26 | Stop reason: HOSPADM

## 2023-05-26 RX ORDER — HYDROMORPHONE HCL 110MG/55ML
PATIENT CONTROLLED ANALGESIA SYRINGE INTRAVENOUS PRN
Status: DISCONTINUED | OUTPATIENT
Start: 2023-05-26 | End: 2023-05-26 | Stop reason: SDUPTHER

## 2023-05-26 RX ORDER — FENTANYL CITRATE 50 UG/ML
100 INJECTION, SOLUTION INTRAMUSCULAR; INTRAVENOUS PRN
Status: DISCONTINUED | OUTPATIENT
Start: 2023-05-26 | End: 2023-05-26 | Stop reason: HOSPADM

## 2023-05-26 RX ORDER — DEXAMETHASONE SODIUM PHOSPHATE 4 MG/ML
INJECTION, SOLUTION INTRA-ARTICULAR; INTRALESIONAL; INTRAMUSCULAR; INTRAVENOUS; SOFT TISSUE PRN
Status: DISCONTINUED | OUTPATIENT
Start: 2023-05-26 | End: 2023-05-26 | Stop reason: SDUPTHER

## 2023-05-26 RX ORDER — PROPOFOL 10 MG/ML
INJECTION, EMULSION INTRAVENOUS PRN
Status: DISCONTINUED | OUTPATIENT
Start: 2023-05-26 | End: 2023-05-26 | Stop reason: SDUPTHER

## 2023-05-26 RX ORDER — SODIUM CHLORIDE 9 MG/ML
INJECTION, SOLUTION INTRAVENOUS PRN
Status: DISCONTINUED | OUTPATIENT
Start: 2023-05-26 | End: 2023-05-26 | Stop reason: HOSPADM

## 2023-05-26 RX ORDER — ONDANSETRON 2 MG/ML
4 INJECTION INTRAMUSCULAR; INTRAVENOUS
Status: DISCONTINUED | OUTPATIENT
Start: 2023-05-26 | End: 2023-05-26 | Stop reason: HOSPADM

## 2023-05-26 RX ORDER — SODIUM CHLORIDE, SODIUM LACTATE, POTASSIUM CHLORIDE, CALCIUM CHLORIDE 600; 310; 30; 20 MG/100ML; MG/100ML; MG/100ML; MG/100ML
INJECTION, SOLUTION INTRAVENOUS CONTINUOUS
Status: DISCONTINUED | OUTPATIENT
Start: 2023-05-26 | End: 2023-05-26 | Stop reason: HOSPADM

## 2023-05-26 RX ORDER — LIDOCAINE HYDROCHLORIDE 20 MG/ML
INJECTION, SOLUTION EPIDURAL; INFILTRATION; INTRACAUDAL; PERINEURAL PRN
Status: DISCONTINUED | OUTPATIENT
Start: 2023-05-26 | End: 2023-05-26 | Stop reason: SDUPTHER

## 2023-05-26 RX ORDER — ONDANSETRON 2 MG/ML
INJECTION INTRAMUSCULAR; INTRAVENOUS PRN
Status: DISCONTINUED | OUTPATIENT
Start: 2023-05-26 | End: 2023-05-26 | Stop reason: SDUPTHER

## 2023-05-26 RX ORDER — HYDROMORPHONE HYDROCHLORIDE 2 MG/ML
0.5 INJECTION, SOLUTION INTRAMUSCULAR; INTRAVENOUS; SUBCUTANEOUS EVERY 5 MIN PRN
Status: DISCONTINUED | OUTPATIENT
Start: 2023-05-26 | End: 2023-05-26 | Stop reason: HOSPADM

## 2023-05-26 RX ORDER — MIDAZOLAM HYDROCHLORIDE 2 MG/2ML
2 INJECTION, SOLUTION INTRAMUSCULAR; INTRAVENOUS
Status: DISCONTINUED | OUTPATIENT
Start: 2023-05-26 | End: 2023-05-26 | Stop reason: HOSPADM

## 2023-05-26 RX ORDER — ROPIVACAINE HYDROCHLORIDE 5 MG/ML
INJECTION, SOLUTION EPIDURAL; INFILTRATION; PERINEURAL PRN
Status: DISCONTINUED | OUTPATIENT
Start: 2023-05-26 | End: 2023-05-26 | Stop reason: ALTCHOICE

## 2023-05-26 RX ORDER — SODIUM CHLORIDE 0.9 % (FLUSH) 0.9 %
5-40 SYRINGE (ML) INJECTION EVERY 12 HOURS SCHEDULED
Status: DISCONTINUED | OUTPATIENT
Start: 2023-05-26 | End: 2023-05-26 | Stop reason: HOSPADM

## 2023-05-26 RX ORDER — LIDOCAINE HYDROCHLORIDE 10 MG/ML
1 INJECTION, SOLUTION INFILTRATION; PERINEURAL
Status: DISCONTINUED | OUTPATIENT
Start: 2023-05-26 | End: 2023-05-26 | Stop reason: HOSPADM

## 2023-05-26 RX ORDER — FENTANYL CITRATE 50 UG/ML
50 INJECTION, SOLUTION INTRAMUSCULAR; INTRAVENOUS PRN
Status: DISCONTINUED | OUTPATIENT
Start: 2023-05-26 | End: 2023-05-26 | Stop reason: HOSPADM

## 2023-05-26 RX ORDER — OXYCODONE HYDROCHLORIDE 5 MG/1
5 TABLET ORAL
Status: DISCONTINUED | OUTPATIENT
Start: 2023-05-26 | End: 2023-05-26 | Stop reason: HOSPADM

## 2023-05-26 RX ORDER — SODIUM CHLORIDE 9 MG/ML
INJECTION, SOLUTION INTRAVENOUS CONTINUOUS
Status: DISCONTINUED | OUTPATIENT
Start: 2023-05-26 | End: 2023-05-26 | Stop reason: HOSPADM

## 2023-05-26 RX ORDER — KETAMINE HYDROCHLORIDE 50 MG/ML
INJECTION, SOLUTION, CONCENTRATE INTRAMUSCULAR; INTRAVENOUS PRN
Status: DISCONTINUED | OUTPATIENT
Start: 2023-05-26 | End: 2023-05-26 | Stop reason: SDUPTHER

## 2023-05-26 RX ADMIN — HYDROMORPHONE HYDROCHLORIDE 0.5 MG: 2 INJECTION INTRAMUSCULAR; INTRAVENOUS; SUBCUTANEOUS at 12:58

## 2023-05-26 RX ADMIN — HYDROMORPHONE HYDROCHLORIDE 0.5 MG: 2 INJECTION INTRAMUSCULAR; INTRAVENOUS; SUBCUTANEOUS at 13:54

## 2023-05-26 RX ADMIN — Medication 2000 MG: at 12:47

## 2023-05-26 RX ADMIN — KETAMINE HYDROCHLORIDE 20 MG: 50 INJECTION, SOLUTION INTRAMUSCULAR; INTRAVENOUS at 12:48

## 2023-05-26 RX ADMIN — LIDOCAINE HYDROCHLORIDE 100 MG: 20 INJECTION, SOLUTION EPIDURAL; INFILTRATION; INTRACAUDAL; PERINEURAL at 12:41

## 2023-05-26 RX ADMIN — SODIUM CHLORIDE, POTASSIUM CHLORIDE, SODIUM LACTATE AND CALCIUM CHLORIDE: 600; 310; 30; 20 INJECTION, SOLUTION INTRAVENOUS at 09:30

## 2023-05-26 RX ADMIN — DEXAMETHASONE SODIUM PHOSPHATE 4 MG: 4 INJECTION, SOLUTION INTRAMUSCULAR; INTRAVENOUS at 12:48

## 2023-05-26 RX ADMIN — PROPOFOL 200 MG: 10 INJECTION, EMULSION INTRAVENOUS at 12:41

## 2023-05-26 RX ADMIN — PROPOFOL 100 MG: 10 INJECTION, EMULSION INTRAVENOUS at 12:42

## 2023-05-26 RX ADMIN — ONDANSETRON 4 MG: 2 INJECTION INTRAMUSCULAR; INTRAVENOUS at 12:48

## 2023-05-26 ASSESSMENT — PAIN SCALES - GENERAL: PAINLEVEL_OUTOF10: 0

## 2023-05-26 NOTE — DISCHARGE INSTRUCTIONS
Post-op instructions for Knee Arthroscopy (Dr. Asia Tobias)    Day of Surgery:     DIET:   Begin with liquids and light foods (jell-o, soup, etc.). Progress to your normal diet if you are not nauseated. MEDICATION:   1. Pain- Norco (hydrocodone/acetaminophen) or Oxycodone. If you are taking oxycodone, you may also take two (2) Tylenol (acetaminophen) 500mg tabs every eight (8) hours. Do not take Tylenol (acetaminophen) if you are given Norco as this medicine already contains acetaminophen. Do not drink alcohol while taking pain medication. Slowly wean off the pain medication as the pain improves. 2. No DVT prophylaxis medicine such as Asprin is necessary in this case   3. Stool Softener-Pain medication can cause constipation. Be sure to drink plenty of water and take an over the counter stool softener as needed. ICE:  Do NOT put the ice machine directly on your skin. Use it frequently for the first 72 hours. You may also use a regular ice bag/pack for 20 minutes at a time. Place a thin layer of clothing or pillow case between ice pack and your skin. Ice for 20-30 minutes on and then 20-30 minutes off. If you have the Surgical dressing intact you may run your ice machine for as long as as comfortable as long as your skin is not irritated/burned. SHOWERING:  No showering. Leave bandages in place. ACTIVITY:  Keep leg elevated on a pillow placed under your ankle. **DO NOT PUT A PILLOW UNDER YOUR KNEE!!**  It is important for you to keep your leg straight. Use crutches and you may put light weight on the operative leg with the brace locked in extension    EXERCISES:  Begin ankle pumps. Attempt quadriceps sets and straight leg raises    First & Second Post-Op Day:    MEDICATION: Continue as instructed above. BANDAGES: No showering. Keep bandage in place. EXERCISES: Continue Quad sets and ankle pumps as above. Bend and extend the knee as tolerated. You may put light weight on the leg.  Continue

## 2023-05-26 NOTE — ANESTHESIA POSTPROCEDURE EVALUATION
Department of Anesthesiology  Postprocedure Note    Patient: Dakota Geronimo  MRN: [id number]  YOB: 2005  Date of evaluation: 5/26/2023      Procedure Summary     Date: 05/26/23 Room / Location: [facility] OP OR 07 / [facility]    Anesthesia Start: 1233 Anesthesia Stop: 4779    Procedure: RIGHT KNEE PATELLA TENDON REPAIR  OPEN VS. TENEX    KNEE  ARTHROSCOPY (Right: Knee) Diagnosis:       Patellar tendinitis, right knee      Right knee pain, unspecified chronicity      (Patellar tendinitis, right knee [M76.51])      (Right knee pain, unspecified chronicity [M25.561])    Surgeons: Janette Salmeron MD Responsible Provider: Teetee Parks MD    Anesthesia Type: general ASA Status: 1          Anesthesia Type: No value filed.     Stuart Phase I: Stuart Score: 10    Stuart Phase II: Stuart Score: 10      Anesthesia Post Evaluation    Patient location during evaluation: bedside  Patient participation: complete - patient participated  Level of consciousness: awake and alert  Pain score: 1  Airway patency: patent  Nausea & Vomiting: no vomiting  Complications: no  Cardiovascular status: hemodynamically stable  Respiratory status: acceptable  Hydration status: euvolemic

## 2023-05-26 NOTE — ANESTHESIA PRE PROCEDURE
Department of Anesthesiology  Preprocedure Note       Name:  Al Valerio   Age:  19 y.o.  :  2005                                          MRN:  [id number]         Date:  2023      Surgeon: Alan Parisi):  Jose Eduardo Blair MD    Procedure: Procedure(s):  RIGHT KNEE PATELLA TENDON REPAIR  OPEN VS. Duayne Ramming    Medications prior to admission:   Prior to Admission medications    Medication Sig Start Date End Date Taking? Authorizing Provider   Multiple Vitamin (MULTIVITAMIN ADULT PO) Take by mouth    Historical Provider, MD   ZINC PO Take by mouth    Historical Provider, MD   Handicap Placard MISC by Does not apply route Temporary:  4 months 23   Jose Eduardo Blair MD       Current medications:    No current facility-administered medications for this visit. No current outpatient medications on file.      Facility-Administered Medications Ordered in Other Visits   Medication Dose Route Frequency Provider Last Rate Last Admin    sodium chloride flush 0.9 % injection 5-40 mL  5-40 mL IntraVENous 2 times per day Sakshi Hometown, APRN - CNP        sodium chloride flush 0.9 % injection 5-40 mL  5-40 mL IntraVENous PRN Sakshi Hometown, APRN - CNP        0.9 % sodium chloride infusion   IntraVENous PRN Sakshi Herman, APRN - CNP        0.9 % sodium chloride infusion   IntraVENous Continuous Sakshi Herman, APRN - CNP        ceFAZolin (ANCEF) 2000 mg in sterile water 20 mL IV syringe  2,000 mg IntraVENous On Call to [address] [place] [address], APRN - CNP        lidocaine 1 % injection 1 mL  1 mL IntraDERmal Once PRN Jessika Shearer., MD        fentaNYL (SUBLIMAZE) injection 100 mcg  100 mcg IntraVENous PRN Jessika Parada MD        Or    fentaNYL (SUBLIMAZE) injection 50 mcg  50 mcg IntraVENous PRN Virgin Fallgarrett., MD        lactated ringers IV soln infusion   IntraVENous Continuous Virgin Manfred.,  mL/hr at 23 0930 New Bag at 23 09    midazolam PF (VERSED) injection 2 mg  2

## 2023-05-26 NOTE — OP NOTE
Operative Report    Patient: Josephine Ceballos MRN: [id number]  SSN: [id number]    YOB: 2005  Age: 19 y.o. Sex: female       Date of Surgery: 5/26/2023     Preoperative Diagnosis: right  Knee recurrent patellar tendinitis  Right knee synovitis    Postoperative Diagnosis: Same    Surgeon(s) and Role:     * Manjula Lazar MD - Primary    Anesthesia: General     Procedure:    1) right  Knee arthroscopy with partial synovectomy  2) Right knee ultrasound guided tenotomy of patellar tendon  3) Bone marrow aspirate concentrate injection right patellar tendon      Estimated Blood Loss:  5 mL    Tourniquet Time:   Total Tourniquet Time Documented:  Thigh (Right) - 17 minutes  Total: Thigh (Right) - 17 minutes          Implants:   None           Specimens: * No specimens in log *        Drains: None                Complications: None    Counts: Sponge and needle counts were correct times two. Indications: See H&P      Findings:  Hypertrophic synovitis of the anterior interval intra-articular  Moderate patellar tendinitis of the distal pole of the patella    Procedure in Detail: After informed consent was obtained the surgical site was marked in the preoperative area by myself the surgeon. Patient was brought to the operating room placed supine the operating table general anesthesia was induced. The operative extremity was prepped and draped in usual orthopedic sterile fashion timeout was performed per protocol. Antibiotics were given per protocol. Small stab incision was made over the medial metadiaphyseal region of the proximal tibia. Bone marrow aspirate concentrate needle was inserted intramedullary and then aspirated out 2 syringes blood for the harvest.  This was then taken and processed in the [employer] B MAC machine to yield about 5 cc of bone marrow aspirate concentrate that would be injected in the patellar tendon area of tendinitis at the conclusion of the procedure.     Initially a

## 2023-05-26 NOTE — INTERVAL H&P NOTE
Update History & Physical    The Patient's History and Physical was reviewed   I discussed the surgery and patients medical condition with the patient. The chart was reviewed with the patient and I examined the patient. There was no change from previous note. The surgical site was confirmed by the patient and me. CV: RRR  RESP: CTAB    Plan:  The risk, benefits, expected outcome, and alternative to the recommended procedure have been discussed with the patient. Patient understands and elects to proceed with the procedure as planned.     Electronically signed by Janette Salmeron MD on 05/26/23 11:59 AM

## 2023-05-30 ENCOUNTER — TELEPHONE (OUTPATIENT)
Dept: ORTHOPEDIC SURGERY | Age: 18
End: 2023-05-30

## 2023-05-30 DIAGNOSIS — M76.51 PATELLAR TENDINITIS, RIGHT KNEE: Primary | ICD-10-CM

## 2023-06-06 NOTE — PROGRESS NOTES
Name: En Bermeo  YOB: 2005  Gender: female  MRN: [id number]    Procedure Performed:   1) right  Knee arthroscopy with partial synovectomy  2) Right knee ultrasound guided tenotomy of patellar tendon  3) Bone marrow aspirate concentrate injection right patellar tendon      Date of Procedure: 05/26/2023      Subjective: She is 12 days s/p of the above procedure. She has discontinued use of the crutches but continues compliance with the brace locked in extension. Physical Examination:Incisions clean dry and intact, no sign of infection. Motor and sensory intact. Full active extension with flexion to 100 degrees no pain in the extremes. No tenderness to palpation. Good resisted range of motion with knee extension. Assessment:   1. S/P orthopedic surgery, follow-up exam         Plan:   Doing well  Continue PT per patellar tendinitis protocol. Protected weightbearing in extension with the knee brace locked for the first 2 weeks and then progression of weightbearing as tolerated. Range of motion as tolerated.    Follow up in 5 weeks with Rakan Handy

## 2023-06-07 ENCOUNTER — OFFICE VISIT (OUTPATIENT)
Dept: ORTHOPEDIC SURGERY | Age: 18
End: 2023-06-07

## 2023-06-07 DIAGNOSIS — Z09 S/P ORTHOPEDIC SURGERY, FOLLOW-UP EXAM: Primary | ICD-10-CM

## 2023-06-07 PROCEDURE — 99024 POSTOP FOLLOW-UP VISIT: CPT | Performed by: SPECIALIST/TECHNOLOGIST

## 2023-07-10 ENCOUNTER — OFFICE VISIT (OUTPATIENT)
Dept: ORTHOPEDIC SURGERY | Age: 18
End: 2023-07-10

## 2023-07-10 DIAGNOSIS — M76.52 PATELLAR TENDINITIS, LEFT KNEE: ICD-10-CM

## 2023-07-10 DIAGNOSIS — M76.51 PATELLAR TENDINITIS, RIGHT KNEE: ICD-10-CM

## 2023-07-10 DIAGNOSIS — Z09 S/P ORTHOPEDIC SURGERY, FOLLOW-UP EXAM: Primary | ICD-10-CM

## 2023-07-10 PROCEDURE — 99024 POSTOP FOLLOW-UP VISIT: CPT | Performed by: ORTHOPAEDIC SURGERY

## 2023-07-10 NOTE — PROGRESS NOTES
Name: Mikey Frausto  YOB: 2005  Gender: female  MRN: [id number]    Procedure Performed:   1) right  Knee arthroscopy with partial synovectomy  2) Right knee ultrasound guided tenotomy of patellar tendon  3) Bone marrow aspirate concentrate injection right patellar tendon      Date of Procedure: 05/26/2023      Subjective:Returns 6 weeks status post the above procedure. She reports she is improving with physical therapy. They are working on ITB syndrome on the right with some snapping and catching. She is doing very well otherwise and is pleased with her progress. She has been doing some soccer ball work. Physical Examination:Incisions clean dry and intact, no sign of infection. Motor and sensory intact. She does have some snapping and pain with pressure over the lateral femoral condyle the IT band on the right. Minimal tenderness over the patellar tendon bilaterally with good resisted strength. Assessment:   1. S/P orthopedic surgery, follow-up exam    2. Patellar tendinitis, right knee    3. Patellar tendinitis, left knee         Plan:   Doing well. .   Continue PT per protocol.   Progressing into soccer activity as tolerated  Follow up in 4-6 weeks

## (undated) DEVICE — DRAPE,U/SHT,SPLIT,FILM,60X84,STERILE: Brand: MEDLINE

## (undated) DEVICE — TUBING PMP L16FT MAIN DISP FOR AR-6400 AR-6475

## (undated) DEVICE — DRAPE,TOWEL,LARGE,INVISISHIELD: Brand: MEDLINE

## (undated) DEVICE — SOLUTION IRRIG 3000ML 0.9% SOD CHL USP UROMATIC PLAS CONT

## (undated) DEVICE — MICRO DUAL CUT (9.0 X 0.38 X 31.0MM)

## (undated) DEVICE — XEROFORM OCCLUSIVE GAUZE STRIP OVERWRAP, 3% BISMUTH TRIBROMOPHENATE IN PETROLATUM BLEND: Brand: XEROFORM

## (undated) DEVICE — ZIMMER® STERILE DISPOSABLE TOURNIQUET CUFF WITH PLC, DUAL PORT, SINGLE BLADDER, 30 IN. (76 CM)

## (undated) DEVICE — PADDING CAST W6INXL4YD ST COT COHESIVE HND TEARABLE SPEC

## (undated) DEVICE — Device

## (undated) DEVICE — ELECTRODE PT RET AD L9FT HI MOIST COND ADH HYDRGEL CORDED

## (undated) DEVICE — 3M™ IOBAN™ 2 ANTIMICROBIAL INCISE DRAPE 6650EZ: Brand: IOBAN™ 2

## (undated) DEVICE — SUTURE ABSORBABLE BRAIDED 0 CT-1 8X27 IN UD VICRYL JJ41G

## (undated) DEVICE — SYRINGE MED 30ML STD CLR PLAS LUERLOCK TIP N CTRL DISP

## (undated) DEVICE — BANDAGE COBAN 6 IN WND 6INX5YD FOAM

## (undated) DEVICE — PAD,ABDOMINAL,5"X9",ST,LF,25/BX: Brand: MEDLINE INDUSTRIES, INC.

## (undated) DEVICE — SPONGE GZ W4XL4IN COT 12 PLY TYP VII WVN C FLD DSGN STERILE

## (undated) DEVICE — GOWN,PREVENTION PLUS,XL,ST,24/CS: Brand: MEDLINE

## (undated) DEVICE — TUBING, SUCTION, 1/4" X 10', STRAIGHT: Brand: MEDLINE

## (undated) DEVICE — SUTURE MCRYL SZ 3-0 L27IN ABSRB UD L19MM PS-2 3/8 CIR PRIM Y427H

## (undated) DEVICE — SUTURE MCRYL SZ 2-0 L36IN ABSRB UD L36MM CT-1 1/2 CIR Y945H

## (undated) DEVICE — BANDAGE COMPR W6INXL12FT SMOOTH FOR LIMB EXSANG ESMARCH

## (undated) DEVICE — BANDAGE COMPR W6INXL10YD ST M E WHITE/BEIGE

## (undated) DEVICE — APPLICATOR MEDICATED 26 CC SOLUTION HI LT ORNG CHLORAPREP

## (undated) DEVICE — INTENDED FOR TISSUE SEPARATION, AND OTHER PROCEDURES THAT REQUIRE A SHARP SURGICAL BLADE TO PUNCTURE OR CUT.: Brand: BARD-PARKER ® STAINLESS STEEL BLADES

## (undated) DEVICE — 2DE12 2-0 UNDYD MONODERM 14X14: Brand: 2DE12 2-0 UNDYD MONODERM 14X14

## (undated) DEVICE — NEEDLE HYPO 18GA L1.5IN PNK S STL HUB POLYPR SHLD REG BVL

## (undated) DEVICE — STOCKINETTE,IMPERVIOUS,12X48,STERILE: Brand: MEDLINE

## (undated) DEVICE — ADHESIVE SKIN CLSR 0.7ML TOP DERMBND ADV

## (undated) DEVICE — PROBE ABLAT 90DEG ASPIR MULTIPORT BPLR RF 1 PC ELECTRD ERGO

## (undated) DEVICE — SURGICAL PROCEDURE PACK BASIC ST FRANCIS

## (undated) DEVICE — TRAY BNE MAR CPRP BMA PLT SENS 1 BTTN AUTOMATION USED TO

## (undated) DEVICE — 2.4 MM X 15 INCH DRILL TIP PASSING                                    PIN, STERILE: Brand: ENDOBUTTON

## (undated) DEVICE — SUTURE ETHBND SZ 1 L18IN NONABSORBABLE CTX L48MM 1/2 CIR CX30D

## (undated) DEVICE — SHEET,DRAPE,53X77,STERILE: Brand: MEDLINE

## (undated) DEVICE — SUTURE VCRL SZ 0 L36IN ABSRB UD L36MM CT-1 1/2 CIR J946H

## (undated) DEVICE — SUTURE FIBERWIRE SZ 5 L38IN NONABSORBABLE BLU L48MM 1/2 AR7211

## (undated) DEVICE — PRECISION THIN (9.0 X 0.38 X 31.0MM)

## (undated) DEVICE — SOLUTION IRRIG 1000ML 0.9% SOD CHL USP POUR PLAS BTL

## (undated) DEVICE — 1010 S-DRAPE TOWEL DRAPE 10/BX: Brand: STERI-DRAPE™

## (undated) DEVICE — FOAM BUMP ROUND LARGE: Brand: MEDLINE INDUSTRIES, INC.

## (undated) DEVICE — SUTURE VCRL SZ 2-0 L18IN ABSRB UD CT-1 L36MM 1/2 CIR J839D

## (undated) DEVICE — GOWN,REINFORCED,POLY,AURORA,XXLARGE,STR: Brand: MEDLINE

## (undated) DEVICE — KNEE ARTHROSCOPY DR KOCH: Brand: MEDLINE INDUSTRIES, INC.

## (undated) DEVICE — T-DRAPE,EXTREMITY,STERILE: Brand: MEDLINE

## (undated) DEVICE — CARDINAL HEALTH FLEXIBLE LIGHT HANDLE COVER: Brand: CARDINAL HEALTH

## (undated) DEVICE — BLADE SHV L13CM DIA4MM CVD EXCALIBUR AGG COOLCUT

## (undated) DEVICE — DRAPE TWL SURG 16X26IN BLU ORB04] ALLCARE INC]